# Patient Record
Sex: MALE | Race: OTHER | HISPANIC OR LATINO | URBAN - METROPOLITAN AREA
[De-identification: names, ages, dates, MRNs, and addresses within clinical notes are randomized per-mention and may not be internally consistent; named-entity substitution may affect disease eponyms.]

---

## 2024-04-25 ENCOUNTER — EMERGENCY (EMERGENCY)
Facility: HOSPITAL | Age: 29
LOS: 0 days | Discharge: HOME | End: 2024-04-25
Attending: EMERGENCY MEDICINE
Payer: MEDICAID

## 2024-04-25 VITALS
TEMPERATURE: 98 F | SYSTOLIC BLOOD PRESSURE: 149 MMHG | RESPIRATION RATE: 18 BRPM | DIASTOLIC BLOOD PRESSURE: 89 MMHG | WEIGHT: 149.91 LBS | HEART RATE: 83 BPM | OXYGEN SATURATION: 100 %

## 2024-04-25 DIAGNOSIS — N43.3 HYDROCELE, UNSPECIFIED: ICD-10-CM

## 2024-04-25 DIAGNOSIS — N50.812 LEFT TESTICULAR PAIN: ICD-10-CM

## 2024-04-25 LAB
APPEARANCE UR: CLEAR — SIGNIFICANT CHANGE UP
BILIRUB UR-MCNC: NEGATIVE — SIGNIFICANT CHANGE UP
COLOR SPEC: YELLOW — SIGNIFICANT CHANGE UP
DIFF PNL FLD: NEGATIVE — SIGNIFICANT CHANGE UP
GLUCOSE UR QL: NEGATIVE MG/DL — SIGNIFICANT CHANGE UP
KETONES UR-MCNC: NEGATIVE MG/DL — SIGNIFICANT CHANGE UP
LEUKOCYTE ESTERASE UR-ACNC: NEGATIVE — SIGNIFICANT CHANGE UP
NITRITE UR-MCNC: NEGATIVE — SIGNIFICANT CHANGE UP
PH UR: 7 — SIGNIFICANT CHANGE UP (ref 5–8)
PROT UR-MCNC: NEGATIVE MG/DL — SIGNIFICANT CHANGE UP
SP GR SPEC: 1.02 — SIGNIFICANT CHANGE UP (ref 1–1.03)
UROBILINOGEN FLD QL: 0.2 MG/DL — SIGNIFICANT CHANGE UP (ref 0.2–1)

## 2024-04-25 PROCEDURE — 87591 N.GONORRHOEAE DNA AMP PROB: CPT

## 2024-04-25 PROCEDURE — 99284 EMERGENCY DEPT VISIT MOD MDM: CPT | Mod: 25

## 2024-04-25 PROCEDURE — 99284 EMERGENCY DEPT VISIT MOD MDM: CPT

## 2024-04-25 PROCEDURE — 76870 US EXAM SCROTUM: CPT | Mod: 26

## 2024-04-25 PROCEDURE — 87491 CHLMYD TRACH DNA AMP PROBE: CPT

## 2024-04-25 PROCEDURE — 76870 US EXAM SCROTUM: CPT

## 2024-04-25 PROCEDURE — 81003 URINALYSIS AUTO W/O SCOPE: CPT

## 2024-04-25 NOTE — ED ADULT NURSE NOTE - NSFALLUNIVINTERV_ED_ALL_ED
Bed/Stretcher in lowest position, wheels locked, appropriate side rails in place/Call bell, personal items and telephone in reach/Instruct patient to call for assistance before getting out of bed/chair/stretcher/Non-slip footwear applied when patient is off stretcher/[place] to call system/Physically safe environment - no spills, clutter or unnecessary equipment/Purposeful proactive rounding/Room/bathroom lighting operational, light cord in reach

## 2024-04-25 NOTE — ED PROVIDER NOTE - PATIENT PORTAL LINK FT
You can access the FollowMyHealth Patient Portal offered by [facility] by registering at the following website: [email or web address]. By joining [employer]’s FollowMyHealth portal, you will also be able to view your health information using other applications (apps) compatible with our system.

## 2024-04-25 NOTE — ED PROVIDER NOTE - OBJECTIVE STATEMENT
29-year-old male, no past medical history, sexually active with her wife only, presents emergency department for 2 weeks of testicular pain.  Mild aching radiation emergency worse in the left testicle.  Denies dysuria.

## 2024-04-25 NOTE — ED PROVIDER NOTE - PHYSICAL EXAMINATION
Physical Exam    Vital Signs: I have reviewed the initial vital signs.  Constitutional: appears stated age, no acute distress  Eyes: Conjunctiva pink, Sclera clear  Gastrointestinal: soft, non-tender abdomen, no pulsatile mass, normal bowl sounds  : Mild tenderness palpation bilateral testicles left greater than right.  No penile lesions.  Musculoskeletal: supple neck, no lower extremity edema, no midline tenderness  Integumentary: warm, dry, no rash  Neurologic: awake, alert, nvi

## 2024-04-25 NOTE — ED PROVIDER NOTE - CLINICAL SUMMARY MEDICAL DECISION MAKING FREE TEXT BOX
Pt with scrotal discomfort, ua neg and US shows small L hydrocele, dc to f/u with uro outpt.  Pt was given strict return to ED precautions and pt indicated that he/she understood.

## 2024-04-25 NOTE — ED ADULT NURSE NOTE - OBJECTIVE STATEMENT
30 yo male c/o left testicular pain, feels lump on left side of testicle. denies pain during urination 28 yo male c/o left testicular pain, feels lump on left side of testicle. denies pain during urination  :[id number] Patiño

## 2024-04-25 NOTE — ED PROVIDER NOTE - NSFOLLOWUPINSTRUCTIONS_ED_ALL_ED_FT
Our Emergency Department Referral Coordinators will be reaching out to you in the next 24-48 hours from 9:00am to 5:00pm with a follow up appointment. Please expect a phone call from the hospital in that time frame. If you do not receive a call or if you have any questions or concerns, you can reach them at   [phone]      Scrotal Pain    WHAT YOU NEED TO KNOW:    What do I need to know about scrotal pain? Scrotal pain can happen at any age. The cause of scrotal pain can range from a minor injury to a serious medical condition. It is very important to seek immediate care if you have scrotal pain. The pain may be a warning sign of a serious condition that will need treatment. Without immediate care, you may be at increased risk for losing a testicle or being sterile (not having children).    What may cause scrotal pain?     Torsion (twisting) of the testicle, cord that carries sperm from the testicle, or tissue attached to the testicle      An infection of the testicle or other area in the scrotum      A hydrocele (fluid buildup around the testicle) or varicocele (blood backup in veins in the scrotum)      An inguinal hernia (tissue pushed out of place in your groin)      Phyllis gangrene (tissue death of the area between the scrotum and anus)      A urinary tract infection or stone that is passing, or an infected appendix      An injury in your groin or scrotum    What are the warning signs of a serious medical problem? Seek care immediately if you have any of the following:     Pain that starts suddenly or is severe      Swelling in your scrotum or groin, especially if you also have severe pain or are vomiting      Red or black patches of skin on your scrotum or area between your penis and anus      Blisters anywhere in your groin or scrotum      A fever    How is the cause of scrotal pain diagnosed? Your healthcare provider will examine you and ask about your pain. Tell the provider when the pain started and how long it lasts. Your provider will ask if pain started in another area and moved to your scrotum. The pain may also have moved from your scrotum to another area. Tell your provider if you have pain during exercise or if you had an injury to your groin. Also tell your provider if you have any problems urinating or if any discharge came out of your penis. Your provider may also ask about your sexual activity.    Blood tests may be used to check for signs of infection.      Ultrasound pictures may show a problem with your testicles or tissues in your scrotum. An ultrasound may also show kidney stones or other problems that may be causing your pain.    How is scrotal pain treated? Treatment will depend on the cause of your pain:    Prescription pain medicine may be given. Ask your healthcare provider how to take this medicine safely. Some prescription pain medicines contain acetaminophen. Do not take other medicines that contain acetaminophen without talking to your healthcare provider. Too much acetaminophen may cause liver damage. Prescription pain medicine may cause constipation. Ask your healthcare provider how to prevent or treat constipation.       NSAIDs, such as ibuprofen, help decrease swelling, pain, and fever. This medicine is available with or without a doctor's order. NSAIDs can cause stomach bleeding or kidney problems in certain people. If you take blood thinner medicine, always ask your healthcare provider if NSAIDs are safe for you. Always read the medicine label and follow directions.      Antibiotics are used to treat a bacterial infection.      Surgery may be needed to untwist the testicle, or cord, or to remove dead or infected tissue.     What can I do to manage my symptoms?     Wear a support device, if directed. A support device, such as a jock strap, can help keep your scrotum lifted and supported. This can help decrease pain.      Apply ice to your scrotum. Ice helps decrease pain and swelling. Use an ice pack, or put crushed ice in a plastic bag. Cover the pack or bag with a towel before you apply it to your scrotum. Apply ice for 15 to 20 minutes every hour, or as directed.    When should I seek immediate care?     You have any warning signs of a serious problem.      You have pain or swelling that starts or gets worse quickly.      You have skin changes in your scrotum, such as a dark patch.      You have a fever.    When should I contact my healthcare provider?     Your pain does not get better, even after you take pain medicine.      You have new or worsening pain.      You have questions or concerns about your condition or care.    CARE AGREEMENT:    You have the right to help plan your care. Learn about your health condition and how it may be treated. Discuss treatment options with your healthcare providers to decide what care you want to receive. You always have the right to refuse treatment.        © Copyright [employer] 2020

## 2024-04-25 NOTE — ED PROVIDER NOTE - ATTENDING APP SHARED VISIT CONTRIBUTION OF CARE
30 yo m with no pmh presents with b/l testicular discomfort, L> R.  pt says also feels like the L testicle is more swollen or has a small ball.  no skin changes.  no penile rash or discharge.  no urinary sx.  pt says only has one partner, no intercourse for a few months.  no fever or chills.  no n/v/d.  pt is asking to eat.  exam: abd nt, nd, b/l testicular mildly ttp, L>R, L mildly enlarged, no masses,  exam as per pa imp: pt with testicular discomfort, US, UA

## 2024-04-26 LAB
C TRACH RRNA SPEC QL NAA+PROBE: SIGNIFICANT CHANGE UP
N GONORRHOEA RRNA SPEC QL NAA+PROBE: SIGNIFICANT CHANGE UP
SPECIMEN SOURCE: SIGNIFICANT CHANGE UP

## 2024-05-14 PROBLEM — Z00.00 ENCOUNTER FOR PREVENTIVE HEALTH EXAMINATION: Status: ACTIVE | Noted: 2024-05-14

## 2024-05-20 ENCOUNTER — OUTPATIENT (OUTPATIENT)
Dept: OUTPATIENT SERVICES | Facility: HOSPITAL | Age: 29
LOS: 1 days | End: 2024-05-20
Payer: SELF-PAY

## 2024-05-20 ENCOUNTER — APPOINTMENT (OUTPATIENT)
Dept: UROLOGY | Facility: CLINIC | Age: 29
End: 2024-05-20
Payer: SELF-PAY

## 2024-05-20 VITALS
HEART RATE: 72 BPM | OXYGEN SATURATION: 99 % | TEMPERATURE: 98.6 F | BODY MASS INDEX: 18.58 KG/M2 | WEIGHT: 124 LBS | DIASTOLIC BLOOD PRESSURE: 72 MMHG | HEIGHT: 68.5 IN | SYSTOLIC BLOOD PRESSURE: 114 MMHG

## 2024-05-20 DIAGNOSIS — N50.812 LEFT TESTICULAR PAIN: ICD-10-CM

## 2024-05-20 DIAGNOSIS — N43.3 HYDROCELE, UNSPECIFIED: ICD-10-CM

## 2024-05-20 DIAGNOSIS — Z00.00 ENCOUNTER FOR GENERAL ADULT MEDICAL EXAMINATION WITHOUT ABNORMAL FINDINGS: ICD-10-CM

## 2024-05-20 PROCEDURE — 99204 OFFICE O/P NEW MOD 45 MIN: CPT

## 2024-05-20 PROCEDURE — G2211 COMPLEX E/M VISIT ADD ON: CPT

## 2024-05-20 NOTE — PLAN

## 2024-05-20 NOTE — PHYSICAL EXAM
[General Appearance - Well Developed] : well developed [General Appearance - Well Nourished] : well nourished [Normal Appearance] : normal appearance [Edema] : no peripheral edema [] : no respiratory distress [Abdomen Soft] : soft [Penis Abnormality] : normal uncircumcised penis [Scrotum] : the scrotum was normal [Epididymis] : the epididymides were normal [Testes Tenderness] : no tenderness of the testes [Testes Mass (___cm)] : there were no testicular masses

## 2024-05-20 NOTE — HISTORY OF PRESENT ILLNESS
[FreeTextEntry1] : Pt is a 28y/o M presenting for follow up from an ER visit 4/27 for LEFT testicular pain. He had a scrotal sono showing: Right testicle measures 4.2 x 2.2 x 2.7 cm. Left testicle measures 2.5 x 2.2 x 3 cm. Symmetric bilateral testicular Doppler flow, without sonographic evidence of torsion or hyperemia. RIGHT:Right testicle: Normal echogenicity and echotexture with no masses or areas of architectural distortion.Right epididymis: 0.4 cm cyst.Right hydrocele: None. LEFT:Left testicle: Normal echogenicity and echotexture with no masses or areas of architectural distortion.Left epididymis: Within normal limits.Trace left hydrocele, 4 cc in volume.  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 Sometimes he feels as if his scrotum is swollen the next morning as well. Denies fever, chills, nausea, vomiting, flank pain, dysuria, or hematuria.

## 2024-05-20 NOTE — ASSESSMENT
[FreeTextEntry1] : 28y/o M with LEFT testicular pain, 4cc hydrocele.  Reviewed imaging findings of scrotal US with patient.  - No acute urologic intervention at this time  - Recommend NSAIDs for pain, scrotal elevation with tight fitting underwear - If pain persists recommend Neurology for possible sciatica workup.  - Follow up with urology as needed. No indication for any intervention for his hydrocele.   A hydrocele is a painless buildup of watery fluid around one or both testicles that causes the scrotum or groin area to swell. This swelling may be unsightly and uncomfortable, but it usually is not painful and generally is not dangerous. No treatment is necessary unless the patient is uncomfortable and is bothered by it.   The treatment options are as follows:  Needle aspiration:  I explained to the patient that I can aspirate some of the fluid from the hydrocele which will decrease the overall size of the hydrocele but it will not solve the problem.  The hydrocele will still be present and over time, the fluid would re-accumulate. The needle aspiration will be done by first cleansing the skin, then using a small butterfly needle, I will slowly draw the fluid out with a luer lock syringe. It is not painful. He would feel the pinch of the needle initially. Complications include bleeding, pain, infection, re-accumulation of the fluid in the hydrocele over time. The patient understands that this is only temporary. There is also risk of infection.  Hydrocelectomy:  This is a simple procedure to remove the sac where the fluid builds up, called the hydrocele sac. The procedure is done in the operating room under anesthesia. A small incision is made in the scrotum skin in order to remove the sac. Complications include pain, swelling of the scrotum, bruising, hematoma, infection as well as scarring. The hydrocele can also reoccur however, the possibility of this is very low. You will be given instructions on care of the incision post operatively as well as a post op visit for a wound check.

## 2024-05-30 DIAGNOSIS — N50.812 LEFT TESTICULAR PAIN: ICD-10-CM

## 2024-05-30 DIAGNOSIS — N43.3 HYDROCELE, UNSPECIFIED: ICD-10-CM
